# Patient Record
Sex: MALE | Race: BLACK OR AFRICAN AMERICAN | NOT HISPANIC OR LATINO | URBAN - METROPOLITAN AREA
[De-identification: names, ages, dates, MRNs, and addresses within clinical notes are randomized per-mention and may not be internally consistent; named-entity substitution may affect disease eponyms.]

---

## 2017-12-09 ENCOUNTER — EMERGENCY (EMERGENCY)
Facility: HOSPITAL | Age: 52
LOS: 1 days | Discharge: HOME | End: 2017-12-09
Attending: EMERGENCY MEDICINE
Payer: MEDICAID

## 2017-12-09 VITALS
DIASTOLIC BLOOD PRESSURE: 66 MMHG | TEMPERATURE: 98 F | HEART RATE: 68 BPM | RESPIRATION RATE: 18 BRPM | SYSTOLIC BLOOD PRESSURE: 132 MMHG | OXYGEN SATURATION: 97 %

## 2017-12-09 PROCEDURE — 96372 THER/PROPH/DIAG INJ SC/IM: CPT

## 2017-12-09 PROCEDURE — 99283 EMERGENCY DEPT VISIT LOW MDM: CPT

## 2017-12-09 PROCEDURE — 99283 EMERGENCY DEPT VISIT LOW MDM: CPT | Mod: 25

## 2017-12-09 RX ORDER — LIDOCAINE 4 G/100G
1 CREAM TOPICAL
Qty: 14 | Refills: 0 | OUTPATIENT
Start: 2017-12-09 | End: 2017-12-16

## 2017-12-09 RX ORDER — KETOROLAC TROMETHAMINE 30 MG/ML
30 SYRINGE (ML) INJECTION ONCE
Qty: 0 | Refills: 0 | Status: DISCONTINUED | OUTPATIENT
Start: 2017-12-09 | End: 2017-12-09

## 2017-12-09 RX ADMIN — Medication 30 MILLIGRAM(S): at 18:57

## 2017-12-09 RX ADMIN — Medication 30 MILLIGRAM(S): at 18:32

## 2017-12-09 NOTE — ED PROVIDER NOTE - MEDICAL DECISION MAKING DETAILS
Pt w/ lower back pain. Will give IM Toradol, heat therapy, send Naproxen/Lidocaine patches and dc w/ ortho follow up.

## 2017-12-09 NOTE — ED ADULT NURSE NOTE - OBJECTIVE STATEMENT
STATES HE FELL FROM A LADDER 2 STEPS. FRIDAY. C/O RIGHT LOWER BACK PAIN . DENIES LOC , HEAD INJURY. DENIES NUMBNESS , TINGLING SENSATION OR INCONTINENCE STATES HE FELL FROM A LADDER 2 STEPS. FRIDAY. C/O RIGHT LOWER BACK PAIN . DENIES LOC , HEAD INJURY. DENIES NUMBNESS , TINGLING SENSATION OR INCONTINENCE. REFUSED REPEAT VS , CARLOS NP AWARE , WITH RELIEF OF PAIN UPON DISCHARGE.

## 2017-12-09 NOTE — ED PROVIDER NOTE - OBJECTIVE STATEMENT
53 y/o M pt w/ no significant PMHx presents to the ED c/o R lower back pain s/p slipping down x2 steps on a ladder yesterday evening. Denies LOC, head injury,  bowel/bladder dysfunction,  weakness, saddle anesthesia, IV drug use, history of CA or any other complaints. NKDA.  Pt took x2 Advil last night to no relief. No interventions today.

## 2017-12-09 NOTE — ED PROVIDER NOTE - ATTENDING CONTRIBUTION TO CARE
51 y/o M presents w c/o R lower back pain s/p slipping down x2 steps while on a ladder yesterday evening. PE reveals R lumbar mild paraspinal tenderness to palpation. No midline TTP. toradol given for pain, warm compresses. Instructed to f/u with Ortho as outpt if persistent pain.

## 2019-03-01 NOTE — ED PROVIDER NOTE - CPE EDP SKIN NORM
Pt c/o feeling the urge to void  Pt made aware of suprapubic cath placement  Pt states "I don't know why they keep doing these procedures " Pt bladder scanned for 17 mL  Suprapubic cath noted to be draining into bedside beg  Pt repositioned to L side, propped with pillows  Pt resting comfortably and watching TV  Bed in locked and low position, side rails up x2  Call bell in reach       Janna Josue, SYL  03/01/19 2433 normal...